# Patient Record
Sex: FEMALE | Race: WHITE
[De-identification: names, ages, dates, MRNs, and addresses within clinical notes are randomized per-mention and may not be internally consistent; named-entity substitution may affect disease eponyms.]

---

## 2017-04-06 ENCOUNTER — HOSPITAL ENCOUNTER (EMERGENCY)
Dept: HOSPITAL 77 - KA.ED | Age: 82
Discharge: NURSING, REHAB OR LONG-TERM CARE | End: 2017-04-06
Payer: MEDICARE

## 2017-04-06 VITALS — SYSTOLIC BLOOD PRESSURE: 145 MMHG | DIASTOLIC BLOOD PRESSURE: 73 MMHG

## 2017-04-06 DIAGNOSIS — Z88.8: ICD-10-CM

## 2017-04-06 DIAGNOSIS — F17.210: ICD-10-CM

## 2017-04-06 DIAGNOSIS — N30.00: Primary | ICD-10-CM

## 2017-04-06 DIAGNOSIS — F41.0: ICD-10-CM

## 2017-04-06 LAB
CHLORIDE SERPL-SCNC: 103 MMOL/L (ref 98–115)
SODIUM SERPL-SCNC: 141 MMOL/L (ref 136–145)

## 2017-04-06 PROCEDURE — 85025 COMPLETE CBC W/AUTO DIFF WBC: CPT

## 2017-04-06 PROCEDURE — 81001 URINALYSIS AUTO W/SCOPE: CPT

## 2017-04-06 PROCEDURE — 71010: CPT

## 2017-04-06 PROCEDURE — 84484 ASSAY OF TROPONIN QUANT: CPT

## 2017-04-06 PROCEDURE — 85610 PROTHROMBIN TIME: CPT

## 2017-04-06 PROCEDURE — 99285 EMERGENCY DEPT VISIT HI MDM: CPT

## 2017-04-06 PROCEDURE — 80053 COMPREHEN METABOLIC PANEL: CPT

## 2017-04-06 PROCEDURE — 96374 THER/PROPH/DIAG INJ IV PUSH: CPT

## 2017-04-06 PROCEDURE — 96375 TX/PRO/DX INJ NEW DRUG ADDON: CPT

## 2017-04-06 PROCEDURE — 85730 THROMBOPLASTIN TIME PARTIAL: CPT

## 2017-04-06 PROCEDURE — 36415 COLL VENOUS BLD VENIPUNCTURE: CPT

## 2017-04-06 PROCEDURE — 70450 CT HEAD/BRAIN W/O DYE: CPT

## 2017-04-06 NOTE — EDM.PDOC
ED HPI NEURO





- General


Chief Complaint: Headache


Stated Complaint: Possible TIA


Time Seen by Provider: 04/06/17 15:21


Source of Information: Reports: Patient, Nursing home records


History Limitations: Reports: No limitations





- History of Present Illness


INITIAL COMMENTS - FREE TEXT/NARRATIVE: 





PT STATES SHE MAY HAVE PASSED OUT THIS AM AT 0800 WHILE SITTING IN CHAIR AT 

[facility]. APPROX DURATION WAS 5 MINUTES. HAD ANOTHER SIMILAR EPISODE AT 1430 

TODAY AND NURSING HOME STAFF BROUGHT PT TO ER. PT STATES SHE THOUGHT IT WAS A 

PANIC ATTACK WHICH ACCORDING TO SON HAS THEM OFTEN. DENIES NEW NUMBNESS, FALL, 

CP, SOB, OR FEVER. 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 

PARALYSIS REMAINS. 


Symptom Onset Date: 04/06/17


Symptom Onset Time: 08:00


Timing/Duration: Reports: Minutes:


Location (Neuro Complaint): Reports: face


Severity: mild


Improves with: Reports: None


Worsens with: Reports: None


Associated Symptoms: Reports: headaches, syncope, nausea/vomiting.  Denies: 

shortness of breath, chest pain, fever/chills





- Related Data


Allergies/ADRs: 


 Allergies











Allergy/AdvReac Type Severity Reaction Status Date / Time


 


hydrochlorathiazide Allergy  Cannot Uncoded 04/06/17 15:47





   Remember  











Home Meds: 


 Home Meds





Cholecalciferol (Vitamin D3) [Vitamin D3] 1,000 units PO DAILY 05/08/15 [History

]


Gluc 2KCl/Chondr/Prerna Hy/Hy Ac [Glucosamine & Chondroitin Cap] 1,000 mg PO 

DAILY 05/08/15 [History]


Latanoprost [Latanoprost] 1 drop EYEBOTH DAILY 05/08/15 [History]


Losartan [Cozaar] 100 mg PO DAILY 05/08/15 [History]


Lovastatin [Lovastatin] 20 mg PO DAILY 05/08/15 [History]


Lutein 20 mg PO DAILY 05/08/15 [History]


Metoprolol Tartrate [Lopressor] 100 mg PO DAILY 05/08/15 [History]


Naproxen Sodium [Aleve] 220 mg PO DAILY 05/08/15 [History]


Niacin 1,500 mg PO DAILY 05/08/15 [History]


Omeprazole [Omeprazole] 20 mg PO DAILY 05/08/15 [History]


Triamcinolone Acetonide [Kenalog 0.1% Crm] 15 gm TOP TID 05/08/15 [History]


amLODIPine Besylate [Amlodipine Besylate] 2.5 mg PO DAILY 05/08/15 [History]











Social & Family History





- Tobacco Use


Smoking Status *Q: Current Every Day Smoker


Years of Tobacco use: 0


Used Tobacco, but Quit: No





- Alcohol Use


Days Per Week of Alcohol Use: 0





- Recreational Drug Use


Recreational Drug Use: No





- Living Situation & Occupation


Living situation: Reports: alone


Occupation: retired





ED ROS GENERAL





- Review of Systems


Review Of Systems: ROS reveals no pertinent complaints other than HPI.


Constitutional: Reports: no symptoms


HEENT: Reports: No symptoms


Respiratory: Reports: No Symptoms


Cardiovascular: Reports: No symptoms


Endocrine: Reports: no symptoms


GI/Abdominal: Reports: Nausea


: Reports: no symptoms


Musculoskeletal: Reports: no symptoms


Skin: Reports: no symptoms


Neurological: Reports: Headache, Numbness, Pre-Existing Deficit


Psychiatric: Reports: No symptoms


Hematologic/Lymphatic: Reports: no symptoms


Immunologic: Reports: no symptoms





ED EXAM, NEURO





- Physical Exam


Exam: See Below


Exam Limited By: No limitations


General Appearance: alert, WD/WN, no apparent distress


Eye Exam: bilateral eye: normal inspection


Ears: normal external exam, normal canal, normal TMs


Nose: normal inspection, normal mucosa, no blood


Throat/Mouth: Normal inspection, Normal oropharynx, No airway compromise


Head Exam: atraumatic, normocephalic


Neck: normal inspection, supple


Respiratory/Chest: no respiratory distress, lungs clear, normal breath sounds, 

no accessory muscle use, chest non-tender


Cardiovascular: regular rate, rhythm, systolic murmur


GI/Abdominal: normal bowel sounds, soft, non tender, no organomegaly, no 

distention, no abnormal bruit, no mass


Neurological: alert, oriented x 3, abnormal light touch


Back Exam: normal inspection.  No: CVA tenderness (L), CVA tenderness (R)


Extremities: normal inspection, non-tender


Psychiatric: normal affect, normal mood


Skin Exam: Warm, Dry, Intact, Normal color, No rash





Course





- Vital Signs


Last Recorded V/S: 


 Last Vital Signs











Temp  96.5 F   04/06/17 15:32


 


Pulse  52 L  04/06/17 15:32


 


Resp  16   04/06/17 15:32


 


BP  169/50 H  04/06/17 15:32


 


Pulse Ox  96   04/06/17 15:32














- Orders/Labs/Meds


Orders: 


 Active Orders 24 hr











 Category Date Time Status


 


 EKG Documentation Completion [RC] ASDIRECTED Care  04/06/17 15:22 Active


 


 Peripheral IV Care [RC] .AS DIRECTED Care  04/06/17 15:22 Active


 


 Chest 1V Frontal [CR] Stat Exams  04/06/17 15:21 Taken


 


 Head wo Cont [CT] Stat Exams  04/06/17 15:21 Taken


 


 Sodium Chloride 0.9% [Syrex Flush] Med  04/06/17 15:21 Active





 5 ml FLUSH Q8HR PRN   


 


 cefTRIAXone [Rocephin] Med  04/06/17 17:27 Once





 1 gm IVPUSH ONETIME ONE   


 


 Peripheral IV Insertion Adult [OM.PC] Urgent Oth  04/06/17 15:21 Ordered


 


 Saline Lock Insert [OM.PC] Stat Oth  04/06/17 15:21 Ordered


 


 EKG 12 Lead [EK] Stat Ther  04/06/17 15:21 Ordered








 Medication Orders





Sodium Chloride (Syrex Flush)  5 ml FLUSH Q8HR PRN


   PRN Reason: Keep Vein Open








Labs: 


 Laboratory Tests











  04/06/17 04/06/17 04/06/17 Range/Units





  15:35 15:35 15:35 


 


WBC  7.8    (5.0-10.0)  10^3/uL


 


RBC  4.79    (3.80-5.50)  10^6/uL


 


Hgb  12.8    (12.0-16.0)  g/dL


 


Hct  39.3    (37.0-47.0)  %


 


MCV  82.1    (82.0-92.0)  fL


 


MCH  26.8 L    (27.0-31.0)  pg


 


MCHC  32.7    (32.0-36.0)  g/dL


 


RDW  14.6 H    (11.5-14.5)  %


 


Plt Count  241    (150-300)  10^3/uL


 


MPV  8.1    (7.4-10.4)  fL


 


Add Manual Diff  Yes    


 


Neutrophils % (Manual)  59    (50-70)  %


 


Band Neutrophils %  1 L    (4-12)  %


 


Lymphocytes % (Manual)  29    (20-40)  %


 


Monocytes % (Manual)  5    (2-8)  %


 


Eosinophils % (Manual)  5 H    (1-3)  %


 


Basophils % (Manual)  1    (0-1)  %


 


PT   9.9   (8.9-11.4)  SEC


 


INR   1.0   (0.9-1.1)  


 


APTT   27.7   (20.8-31.2)  SEC


 


Sodium    141  (136-145)  mmol/L


 


Potassium    4.6  (3.3-5.3)  mmol/L


 


Chloride    103  ()  mmol/L


 


Carbon Dioxide    31.3  (21.0-32.0)  mmol/L


 


BUN    17  (6-25)  mg/dL


 


Creatinine    0.85  (0.51-1.17)  mg/dL


 


Est Cr Clr Drug Dosing    41.48  mL/min


 


Estimated GFR (MDRD)    > 60  mL/min


 


Glucose    98  ()  mg/dL


 


Calcium    9.0  (8.7-10.3)  mg/dL


 


Total Bilirubin    0.3  (0.2-1.0)  mg/dL


 


AST    33  (15-37)  U/L


 


ALT    28  (12-78)  U/L


 


Alkaline Phosphatase    88  ()  IU/L


 


Troponin I    0.05  (0.00-0.070)  ng/mL


 


Total Protein    8.3 H  (6.4-8.2)  g/dL


 


Albumin    3.69  (3.00-4.80)  g/dL


 


Specimen Type     


 


Urine Color     (YELLOW)  


 


Urine Appearance     (CLEAR)  


 


Urine pH     (5.0-9.0)  


 


Ur Specific Gravity     (1.005-1.030)  


 


Urine Protein     (NEGATIVE)  mg/dL


 


Urine Glucose (UA)     (NEGATIVE)  mg/dL


 


Urine Ketones     (NEGATIVE)  mg/dL


 


Urine Occult Blood     (NEGATIVE)  


 


Urine Nitrite     (NEGATIVE)  


 


Urine Bilirubin     (NEGATIVE)  


 


Urine Urobilinogen     (0.2-1.0)  E.U./dL


 


Ur Leukocyte Esterase     (NEGATIVE)  


 


Urine RBC     /HPF


 


Urine WBC     /HPF


 


Urine Bacteria     (NONE TO FEW)  /HPF














  04/06/17 Range/Units





  16:30 


 


WBC   (5.0-10.0)  10^3/uL


 


RBC   (3.80-5.50)  10^6/uL


 


Hgb   (12.0-16.0)  g/dL


 


Hct   (37.0-47.0)  %


 


MCV   (82.0-92.0)  fL


 


MCH   (27.0-31.0)  pg


 


MCHC   (32.0-36.0)  g/dL


 


RDW   (11.5-14.5)  %


 


Plt Count   (150-300)  10^3/uL


 


MPV   (7.4-10.4)  fL


 


Add Manual Diff   


 


Neutrophils % (Manual)   (50-70)  %


 


Band Neutrophils %   (4-12)  %


 


Lymphocytes % (Manual)   (20-40)  %


 


Monocytes % (Manual)   (2-8)  %


 


Eosinophils % (Manual)   (1-3)  %


 


Basophils % (Manual)   (0-1)  %


 


PT   (8.9-11.4)  SEC


 


INR   (0.9-1.1)  


 


APTT   (20.8-31.2)  SEC


 


Sodium   (136-145)  mmol/L


 


Potassium   (3.3-5.3)  mmol/L


 


Chloride   ()  mmol/L


 


Carbon Dioxide   (21.0-32.0)  mmol/L


 


BUN   (6-25)  mg/dL


 


Creatinine   (0.51-1.17)  mg/dL


 


Est Cr Clr Drug Dosing   mL/min


 


Estimated GFR (MDRD)   mL/min


 


Glucose   ()  mg/dL


 


Calcium   (8.7-10.3)  mg/dL


 


Total Bilirubin   (0.2-1.0)  mg/dL


 


AST   (15-37)  U/L


 


ALT   (12-78)  U/L


 


Alkaline Phosphatase   ()  IU/L


 


Troponin I   (0.00-0.070)  ng/mL


 


Total Protein   (6.4-8.2)  g/dL


 


Albumin   (3.00-4.80)  g/dL


 


Specimen Type  Urincath  


 


Urine Color  Yellow  (YELLOW)  


 


Urine Appearance  Slightly cloudy H  (CLEAR)  


 


Urine pH  5.5  (5.0-9.0)  


 


Ur Specific Gravity  1.015  (1.005-1.030)  


 


Urine Protein  Negative  (NEGATIVE)  mg/dL


 


Urine Glucose (UA)  Negative  (NEGATIVE)  mg/dL


 


Urine Ketones  Negative  (NEGATIVE)  mg/dL


 


Urine Occult Blood  Trace-lysed H  (NEGATIVE)  


 


Urine Nitrite  Negative  (NEGATIVE)  


 


Urine Bilirubin  Negative  (NEGATIVE)  


 


Urine Urobilinogen  0.2  (0.2-1.0)  E.U./dL


 


Ur Leukocyte Esterase  Small H  (NEGATIVE)  


 


Urine RBC  0-5  /HPF


 


Urine WBC  20-30 H  /HPF


 


Urine Bacteria  Moderate H  (NONE TO FEW)  /HPF











Meds: 


Medications











Generic Name Dose Route Start Last Admin





  Trade Name Freq  PRN Reason Stop Dose Admin


 


Sodium Chloride  5 ml  04/06/17 15:21  





  Syrex Flush  FLUSH   





  Q8HR PRN   





  Keep Vein Open   














Discontinued Medications














Generic Name Dose Route Start Last Admin





  Trade Name Freq  PRN Reason Stop Dose Admin


 


Ondansetron HCl  4 mg  04/06/17 15:36  04/06/17 17:00





  Zofran  IVPUSH  04/06/17 15:37  4 mg





  ONETIME ONE   Administration














Departure





- Departure


Time of Disposition: 18:03


Disposition: DC/Tfer to [facility]


Condition: good


Clinical Impression: 


 Panic attacks





Urinary tract infection


Qualifiers:


 Urinary tract infection type: acute cystitis Hematuria presence: without 

hematuria Qualified Code(s): N30.00 - Acute cystitis without hematuria





Instructions:  Urinary Tract Infection, Adult, Panic Attacks, Easy-to-Read





- My Orders


Last 24 Hours: 


My Active Orders





04/06/17 15:21


Chest 1V Frontal [CR] Stat 


Head wo Cont [CT] Stat 


Sodium Chloride 0.9% [Syrex Flush]   5 ml FLUSH Q8HR PRN 


Peripheral IV Insertion Adult [OM.PC] Urgent 


Saline Lock Insert [OM.PC] Stat 


EKG 12 Lead [EK] Stat 





04/06/17 15:22


EKG Documentation Completion [RC] ASDIRECTED 


Peripheral IV Care [RC] .AS DIRECTED 





04/06/17 17:27


cefTRIAXone [Rocephin]   1 gm IVPUSH ONETIME ONE 














- Assessment/Plan


Last 24 Hours: 


My Active Orders





04/06/17 15:21


Chest 1V Frontal [CR] Stat 


Head wo Cont [CT] Stat 


Sodium Chloride 0.9% [Syrex Flush]   5 ml FLUSH Q8HR PRN 


Peripheral IV Insertion Adult [OM.PC] Urgent 


Saline Lock Insert [OM.PC] Stat 


EKG 12 Lead [EK] Stat 





04/06/17 15:22


EKG Documentation Completion [RC] ASDIRECTED 


Peripheral IV Care [RC] .AS DIRECTED 





04/06/17 17:27


cefTRIAXone [Rocephin]   1 gm IVPUSH ONETIME ONE 











Assessment:: 





UTI


Plan: 





BACTRIM / F-U WITH PCP IN 2 DAYS

## 2018-01-26 ENCOUNTER — HOSPITAL ENCOUNTER (EMERGENCY)
Dept: HOSPITAL 77 - KA.ED | Age: 83
Discharge: NURSING, REHAB OR LONG-TERM CARE | End: 2018-01-26
Payer: MEDICARE

## 2018-01-26 VITALS — DIASTOLIC BLOOD PRESSURE: 50 MMHG | SYSTOLIC BLOOD PRESSURE: 131 MMHG

## 2018-01-26 DIAGNOSIS — Z88.8: ICD-10-CM

## 2018-01-26 DIAGNOSIS — Z79.899: ICD-10-CM

## 2018-01-26 DIAGNOSIS — Z87.891: ICD-10-CM

## 2018-01-26 DIAGNOSIS — M25.561: Primary | ICD-10-CM

## 2018-01-26 NOTE — EDM.PDOC
ED HPI GENERAL MEDICAL PROBLEM





- General


Chief Complaint: Lower Extremity Injury/Pain


Stated Complaint: R hip-leg pain


Time Seen by Provider: 01/26/18 04:02


Source of Information: Reports: Patient


History Limitations: Reports: No Limitations





- History of Present Illness


INITIAL COMMENTS - FREE TEXT/NARRATIVE: 





Patient presents via ambulance from [facility] with complaint of right leg pain.  

Patient tells me she went to bed around 9pm and while getting in bed with the 

lift, she bumped her right lateral knee against the wall.  A few hours later 

she awoke with pain at the knee.  She took Tylenol but it didn't help that 

much.  Pain is 5/5.  She hasn't put weight on her leg for 3 years and denies 

any falls.  No history of heart disease or clots.  She had a stroke about 8 

months ago that left her paralyzed on the left side.  She has told me a couple 

of times that she hasn't been able to walk on her right leg for three years but 

also tell me that she walked fine until her stroke last May.  She is getting 

therapy for her legs.  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 

can move her left foot okay.


Treatments PTA: Reports: Acetaminophen





- Related Data


 Allergies











Allergy/AdvReac Type Severity Reaction Status Date / Time


 


dimethyl sulfoxide Allergy  Cannot Uncoded 01/26/18 03:41





   Remember  


 


hydrochlorathiazide Allergy  Cannot Uncoded 04/06/17 15:47





   Remember  











Home Meds: 


 Home Meds





Cholecalciferol (Vitamin D3) [Vitamin D3] 1,000 units PO DAILY 05/08/15 [History

]


Gluc 2KCl/Chondr/Prerna Hy/Hy Ac [Glucosamine & Chondroitin Cap] 1,000 mg PO 

DAILY 05/08/15 [History]


Latanoprost [Latanoprost] 1 drop EYEBOTH DAILY 05/08/15 [History]


Losartan [Cozaar] 100 mg PO DAILY 05/08/15 [History]


Lovastatin [Lovastatin] 20 mg PO DAILY 05/08/15 [History]


Lutein 20 mg PO DAILY 05/08/15 [History]


Metoprolol Tartrate [Lopressor] 100 mg PO DAILY 05/08/15 [History]


Naproxen Sodium [Aleve] 220 mg PO DAILY 05/08/15 [History]


Niacin 1,500 mg PO DAILY 05/08/15 [History]


Omeprazole [Omeprazole] 20 mg PO DAILY 05/08/15 [History]


Triamcinolone Acetonide [Kenalog 0.1% Crm] 15 gm TOP TID 05/08/15 [History]


amLODIPine Besylate [Amlodipine Besylate] 2.5 mg PO DAILY 05/08/15 [History]


Cephalexin [Keflex] 500 mg PO TID #15 cap 04/06/17 [Rx]











Social & Family History





- Tobacco Use


Smoking Status *Q: Former Smoker


Years of Tobacco use: 0


Used Tobacco, but Quit: Yes


Month Tobacco Last Used: may 2016





- Caffeine Use


Caffeine Use: Reports: Coffee





- Alcohol Use


Days Per Week of Alcohol Use: 0





- Recreational Drug Use


Recreational Drug Use: No





- Living Situation & Occupation


Living situation: Reports: Alone


Occupation: Retired





Review of Systems





- Review of Systems


Review Of Systems: See Below


Constitutional: Denies: Chills, Fever


Eyes: Denies: Vision Change


Mouth/Throat: Reports: No Symptoms


Respiratory: Denies: Shortness of Breath, Cough


Cardiovascular: Denies: Chest Pain, Irregular Heart Rate, Lightheadedness


GI/Abdominal: Denies: Abdominal Pain, Diarrhea, Nausea, Vomiting


Genitourinary: Reports: Incontinence.  Denies: Dysuria


Musculoskeletal: Reports: Back Pain (arthritis).  Denies: Neck Pain, Shoulder 

Pain, Joint Swelling


Skin: Denies: Cyanosis, Jaundice, Mottled, Pallor, Diaphoresis


Neurological: Denies: Numbness, Tingling, Trouble Speaking





ED EXAM, GENERAL





- Physical Exam


Exam: See Below


Exam Limited By: No Limitations


General Appearance: Alert, No Apparent Distress, Obese


Eye Exam: Bilateral Eye: EOMI, Normal Inspection, PERRL


Ears: Normal External Exam, Hearing Grossly Normal


Nose: Normal Inspection, No Blood


Throat/Mouth: Normal Inspection, Normal Lips, Normal Voice, No Airway Compromise


Head: Atraumatic, Normocephalic


Neck: Normal Inspection, Supple, Non-Tender.  No: Carotid Bruit


Respiratory/Chest: No Respiratory Distress, Lungs Clear, Normal Breath Sounds.  

No: Stridor


Cardiovascular: Regular Rate, Rhythm (with mild bradycardia), No JVD, Systolic 

Murmur


Extremities: No Pedal Edema, Normal Capillary Refill, Other (Moderate ROM of 

right knee and hip is well tolerated without pain.  Palpation of the leg/knee 

reveals pain primarily at the right patella but no ecchymosis or bruising.  

Calf is supple and non-tender to squeeze without swelling.  No hip pain on ROM 

or palpation.  She can dorsiflex and plantar flex both feet okay and sensation 

is intact bilat.  No assymetric swelling.  She has chronic weakness of both 

legs and is unable to raise them from the table.).  No: Venkatesh's Sign, Mottled, 

Pallor, Redness


Neurological: Alert, Oriented, No Motor/Sensory Deficits


Psychiatric: Normal Affect, Normal Mood


Skin Exam: Warm, Dry, Intact, Normal Color, No Rash





Course





- Vital Signs


Last Recorded V/S: 





 Last Vital Signs











Temp  98.8 F   01/26/18 03:41


 


Pulse  52 L  01/26/18 03:41


 


Resp  20   01/26/18 03:41


 


BP  144/52 H  01/26/18 03:41


 


Pulse Ox  94 L  01/26/18 03:41














- Re-Assessments/Exams


Free Text/Narrative Re-Assessment/Exam: 





01/26/18 04:50


Pain is decreasing with application of ice to the anterior knee.  I see no 

evidence of fracture or DVT.  Discussed findings with patient.  She has been 

stable throughout ER course.





Departure





- Departure


Time of Disposition: 04:54


Disposition: DC/Tfer to SNF [facility]


Condition: Fair


Clinical Impression: 


 Right anterior knee pain








- Discharge Information


Referrals: 


Harper Ko PA-C [Primary Care Provider] - 


Additional Instructions: 


1. Continue to use Tylenol and ice as needed to control knee pain. 


2. May try NSAIDS if okay with PCP.


3. Recheck with PCP if persisting or worsening.

## 2019-12-28 NOTE — PCM.PN
- General Info


Date of Service: 12/28/19


Functional Status: Reports: Pain Controlled, Tolerating Diet, Urinating.  Denies

: New Symptoms





- Review of Systems


General: Denies: Fever


HEENT: Reports: Other (drainage of left eye).  Denies: Eye Pain, Headaches


Pulmonary: Denies: Shortness of Breath, Cough


Cardiovascular: Denies: Chest Pain


Gastrointestinal: Denies: Abdominal Pain


Musculoskeletal: Reports: Leg Pain (left), Foot Pain (left)


Neurological: Denies: Headache





- Patient Data


Vitals - Most Recent: 


 Last Vital Signs











Temp  96.8 F   12/28/19 06:18


 


Pulse  98   12/28/19 09:10


 


Resp  24 H  12/28/19 06:18


 


BP  110/71   12/28/19 09:10


 


Pulse Ox  94 L  12/28/19 06:18











Weight - Most Recent: 196 lb


I&O - Last 24 Hours: 


 Intake & Output











 12/27/19 12/28/19 12/28/19





 22:59 06:59 14:59


 


Intake Total 520 300 


 


Balance 520 300 











Lab Results Last 24 Hours: 


 Laboratory Results - last 24 hr











  12/27/19 12/27/19 12/27/19 Range/Units





  13:00 13:00 15:00 


 


WBC     (5.00-10.00)  10^3/uL


 


RBC     (3.80-5.50)  10^6/uL


 


Hgb     (12.0-16.0)  g/dL


 


Hct     (37.0-47.0)  %


 


MCV     (82.0-92.0)  fL


 


MCH     (27.0-31.0)  pg


 


MCHC     (32.0-36.0)  g/dL


 


RDW     (11.5-14.5)  %


 


Plt Count     (150-400)  10^3/uL


 


MPV     (7.4-10.4)  fL


 


Immature Gran % (Auto)     (0.0-5.0)  %


 


Neut % (Auto)     (50.0-70.0)  %


 


Lymph % (Auto)     (20.0-40.0)  %


 


Mono % (Auto)     (2.0-8.0)  %


 


Eos % (Auto)     (1.0-3.0)  %


 


Baso % (Auto)     (0.0-1.0)  %


 


Immature Gran # (Auto)     (0.00-0.50)  10^3/uL


 


Neut # (Auto)     (2.50-7.00)  10^3/uL


 


Lymph # (Auto)     (1.00-4.00)  10^3/uL


 


Mono # (Auto)     (0.10-0.80)  10^3/uL


 


Eos # (Auto)     (0.10-0.30)  10^3/uL


 


Baso # (Auto)     (0.00-0.10)  10^3/uL


 


Sodium     (136-145)  mmol/L


 


Potassium     (3.3-5.3)  mmol/L


 


Chloride     ()  mmol/L


 


Carbon Dioxide     (21.0-32.0)  mmol/L


 


Anion Gap     (5-15)  mmol/L


 


BUN     (6-25)  mg/dL


 


Creatinine     (0.51-1.17)  mg/dL


 


Est Cr Clr Drug Dosing     mL/min


 


Estimated GFR (MDRD)     mL/min


 


Glucose    (75 - 99) mg/dL


 


Calcium     (8.7-10.3)  mg/dL


 


Magnesium  1.8    (1.8-2.4)  mg/dL


 


Troponin I  0.04    (0.00-0.070)  ng/mL


 


B-Natriuretic Peptide   143 H   (0-100)  pg/mL


 


Free T4    < 0.20 L  (0.59-1.17)  ng/dL


 


TSH, Ultra Sensitive   78.100 H   (0.340-4.820)  uIU/mL














  12/28/19 12/28/19 Range/Units





  08:05 08:05 


 


WBC  6.68   (5.00-10.00)  10^3/uL


 


RBC  4.57   (3.80-5.50)  10^6/uL


 


Hgb  12.4   (12.0-16.0)  g/dL


 


Hct  40.0   (37.0-47.0)  %


 


MCV  87.5  D   (82.0-92.0)  fL


 


MCH  27.1   (27.0-31.0)  pg


 


MCHC  31.0 L   (32.0-36.0)  g/dL


 


RDW  15.0 H   (11.5-14.5)  %


 


Plt Count  191   (150-400)  10^3/uL


 


MPV  10.0   (7.4-10.4)  fL


 


Immature Gran % (Auto)  0.1   (0.0-5.0)  %


 


Neut % (Auto)  73.2 H   (50.0-70.0)  %


 


Lymph % (Auto)  12.6 L   (20.0-40.0)  %


 


Mono % (Auto)  9.0 H   (2.0-8.0)  %


 


Eos % (Auto)  4.5 H   (1.0-3.0)  %


 


Baso % (Auto)  0.6   (0.0-1.0)  %


 


Immature Gran # (Auto)  0.01   (0.00-0.50)  10^3/uL


 


Neut # (Auto)  4.89   (2.50-7.00)  10^3/uL


 


Lymph # (Auto)  0.84 L   (1.00-4.00)  10^3/uL


 


Mono # (Auto)  0.60   (0.10-0.80)  10^3/uL


 


Eos # (Auto)  0.30   (0.10-0.30)  10^3/uL


 


Baso # (Auto)  0.04   (0.00-0.10)  10^3/uL


 


Sodium   145  (136-145)  mmol/L


 


Potassium   4.1  (3.3-5.3)  mmol/L


 


Chloride   102  ()  mmol/L


 


Carbon Dioxide   28.7  (21.0-32.0)  mmol/L


 


Anion Gap   18.4 H  (5-15)  mmol/L


 


BUN   13  (6-25)  mg/dL


 


Creatinine   0.76  (0.51-1.17)  mg/dL


 


Est Cr Clr Drug Dosing   43.95  mL/min


 


Estimated GFR (MDRD)   > 60  mL/min


 


Glucose   93 (75 - 99) mg/dL


 


Calcium   8.8  (8.7-10.3)  mg/dL


 


Magnesium    (1.8-2.4)  mg/dL


 


Troponin I    (0.00-0.070)  ng/mL


 


B-Natriuretic Peptide    (0-100)  pg/mL


 


Free T4    (0.59-1.17)  ng/dL


 


TSH, Ultra Sensitive    (0.340-4.820)  uIU/mL











Med Orders - Current: 


 Current Medications





Acetaminophen (Tylenol)  650 mg PO BID NATY


   Last Admin: 12/28/19 09:10 Dose:  650 mg


Albuterol (Proventil Neb Soln)  2.5 mg INH BID PRN


   PRN Reason: Shortness of Breath


Apixaban (Eliquis)  5 mg PO BID [facility]


   Last Admin: 12/28/19 09:10 Dose:  5 mg


Artificial Tears (Refresh Tears 0.5%)  0 ml EYEBOTH TID@0500,1100,2000 [facility]


Aspirin (Aspirin)  81 mg PO WITHBREAKFAST [facility]


   Last Admin: 12/28/19 07:40 Dose:  81 mg


Atorvastatin Calcium (Lipitor)  20 mg PO BEDTIME [facility]


   Last Admin: 12/27/19 20:58 Dose:  20 mg


Atropine Sulfate (Atropine 0.1 Mg/Ml)  0 mg IVPUSH ASDIRECTED PRN


   PRN Reason: Heart.


Brimonidine Tartrate (Brimonidine Tartrate 0.2% Ophth Soln)  0 ml EYEBOTH BID 

[facility]


   Last Admin: 12/28/19 09:22 Dose:  1 drop


Buspirone HCl (Buspar)  5 mg PO 0800,1600 [facility]


   Last Admin: 12/28/19 07:40 Dose:  5 mg


Cholecalciferol (Vitamin D3)  50 mcg PO DAILY [facility]


   Last Admin: 12/28/19 09:10 Dose:  50 mcg


Epinephrine HCl (Epinephrine 1:10,000)  1 mg IVPUSH ASDIRECTED PRN


   PRN Reason: Heart.


Gabapentin (Neurontin)  100 mg PO DAILY [facility]


   Last Admin: 12/28/19 09:10 Dose:  100 mg


Gabapentin (Neurontin)  200 mg PO BEDTIME [facility]


   Last Admin: 12/27/19 20:56 Dose:  200 mg


Glycopyrrolate/Indacaterol (Utibron Neohaler 27.5-15.6 Mcg)  0 each IH BID [facility]


   Last Admin: 12/28/19 09:36 Dose:  1 inhalation


Lactobacillus Acidophilus/Rhamnosus (Multi-Dyana Plus)  1 cap PO DAILY [facility]


   Last Admin: 12/28/19 09:10 Dose:  1 cap


Latanoprost (Xalatan 0.005% Ophth Soln)  0 ml EYEBOTH BEDTIME [facility]


   Last Admin: 12/27/19 22:06 Dose:  1 drop


Levothyroxine Sodium (Synthroid)  50 mcg PO ACBREAKFAST [facility]


   Last Admin: 12/28/19 07:39 Dose:  50 mcg


Lidocaine HCl (Xylocaine 2%)  0 mg IVPUSH ASDIRECTED PRN


   PRN Reason: Heart.


Magnesium Hydroxide (Milk Of Magnesia)  30 ml PO DAILY PRN


   PRN Reason: Constipation


Metoprolol Tartrate (Lopressor)  25 mg PO BID [facility]


   Last Admin: 12/28/19 09:10 Dose:  25 mg


Multivitamins/Minerals (Centrum)  1 tab PO DAILY [facility]


   Last Admin: 12/28/19 09:10 Dose:  1 tab


Nitroglycerin (Nitrostat)  0.4 mg SL ASDIRECTED PRN


   PRN Reason: Heart.


Omeprazole (Omeprazole)  20 mg PO ACBREAKFAST [facility]


   Last Admin: 12/28/19 07:40 Dose:  20 mg


Sertraline HCl (Zoloft)  50 mg PO DAILY [facility]


   Last Admin: 12/28/19 09:10 Dose:  50 mg


Sodium Chloride (Saline Flush)  10 ml FLUSH Q8HR PRN


   PRN Reason: keep vein open


   Last Admin: 12/27/19 20:14 Dose:  10 ml


Trazodone HCl (Trazodone)  50 mg PO BEDTIME [facility]


   Last Admin: 12/27/19 21:00 Dose:  50 mg





Discontinued Medications





Artificial Tears (Liquitears 1.4% Ophth Soln)  0 ml EYEBOTH TID@05,11,20 [facility]


   Last Admin: 12/28/19 05:26 Dose:  Not Given


Buspirone HCl (Buspar)  5 mg PO BID [facility]


   Last Admin: 12/27/19 20:59 Dose:  5 mg


Losartan Potassium (Cozaar)  75 mg PO DAILY [facility]


   Last Admin: 12/28/19 09:32 Dose:  Not Given


Metoprolol Tartrate (Lopressor)  5 mg IVPUSH ONETIME ONE


   Stop: 12/27/19 14:53


   Last Admin: 12/27/19 16:38 Dose:  5 mg


Metoprolol Tartrate (Lopressor)  5 mg IVPUSH ONETIME ONE


   Stop: 12/27/19 18:39


   Last Admin: 12/27/19 18:54 Dose:  5 mg


Metoprolol Tartrate (Lopressor)  5 mg IVPUSH Q1H PRN


   PRN Reason: Tachycardia


Metoprolol Tartrate (Lopressor)  5 mg IVPUSH ONETIME ONE


   Stop: 12/27/19 19:30


   Last Admin: 12/27/19 20:12 Dose:  5 mg


Non-Formulary Medication (Benzocaine [Orajel])  1 applic BUCCAL TID PRN


   PRN Reason: sore gums


Non-Formulary Medication (Guaifenesin [Liquituss Gg])  10 ml PO BID PRN


   PRN Reason: Cough


Non-Formulary Medication (Pantoprazole Sodium [Protonix])  20 mg PO DAILY@0500 

NATY











- Exam


Quality Assessment: DVT Prophylaxis (on Eliquis).  No: Supplemental Oxygen (94% 

on room air)


General: Alert, Oriented (to self), Cooperative, No Acute Distress


HEENT: Other (conjunctiva clear bilaterally, with yellowish discharge to left 

eyelashes)


Lungs: Clear to Auscultation (upper lobes), Normal Respiratory Effort, Crackles 

(to bilateral bases).  No: Wheezing


Cardiovascular: Irregular Rhythm, Tachycardia


Extremities: Pedal Edema (trace pitting edema to BLE)


Skin: Warm, Dry, Intact


Neurological: Normal Speech, Other (hemiparesis of left arm and leg)


Psy/Mental Status: Alert, Normal Affect, Normal Mood.  No: Anxious





Sepsis Event Note





- Evaluation


Sepsis Screening Result: Sepsis Risk





- Focused Exam


Vital Signs: 


 Vital Signs











  Temp Pulse Pulse Resp BP BP Pulse Ox


 


 12/28/19 09:10   98    110/71  


 


 12/28/19 06:18  96.8 F   105 H  24 H   131/88  94 L


 


 12/28/19 03:00  96.7 F   110 H  28 H   110/76  93 L


 


 12/27/19 23:00  96.6 F    24 H   107/75  91 L











Date Exam was Performed: 12/28/19


Time Exam was Performed: 10:56





- Problem List Review


Problem List Initiated/Reviewed/Updated: Yes





- My Orders


Last 24 Hours: 


My Active Orders





12/27/19 12:23


Up With Assistance [RC] ASDIRECTED 


CULTURE SPUTUM + SMEAR [RM] Stat 


Sodium Chloride 0.9% [Saline Flush]   10 ml FLUSH Q8HR PRN 


Saline Lock Insert [OM.PC] Routine 


Resuscitation Status Routine 





12/27/19 12:24


Admission Status [Patient Status] [ADT] Routine 


Oxygen Therapy [RC] PRN 


VTE/DVT Education [RC] PER UNIT ROUTINE 


Vital Signs [RC] 0300,0700,1100,1500,1900,2300 





12/27/19 12:33


Telemetry Monitoring [Cardiac Monitoring] [RC] 0300,0700,1100,1500,1900,2300 


Atropine [Atropine 0.1 MG/ML]   See Dose Instructions  IVPUSH ASDIRECTED PRN 


EPINEPHrine [EPINEPHrine 1:10,000]   1 mg IVPUSH ASDIRECTED PRN 


Lidocaine 2% [Xylocaine 2%]   See Dose Instructions  IVPUSH ASDIRECTED PRN 


Nitroglycerin [Nitrostat]   0.4 mg SL ASDIRECTED PRN 





12/27/19 15:00


FREE T3 [REF] Routine 


Albuterol [Proventil Neb Soln]   2.5 mg INH BID PRN 





12/27/19 15:02


Magnesium Hydroxide [Milk of Magnesia]   30 ml PO DAILY PRN 





12/27/19 21:00


Acetaminophen [Tylenol]   650 mg PO BID 


Apixaban [Eliquis]   5 mg PO BID 


Brimonidine Tartrate [Brimonidine Tartrate 0.2% Ophth Soln]   0 ml EYEBOTH BID 


Gabapentin [Neurontin]   200 mg PO BEDTIME 


Latanoprost [Xalatan 0.005% Ophth Soln]   0 ml EYEBOTH BEDTIME 


Metoprolol Tartrate [Lopressor]   25 mg PO BID 


atorvaSTATin [Lipitor]   20 mg PO BEDTIME 


traZODone   50 mg PO BEDTIME 





12/27/19 Dinner


Pureed Diet [DIET] 





12/28/19 07:30


Levothyroxine [Synthroid]   50 mcg PO ACBREAKFAST 


Omeprazole   20 mg PO ACBREAKFAST 





12/28/19 08:00


Aspirin   81 mg PO WITHBREAKFAST 


busPIRone [Buspar]   5 mg PO 0800,1600 





12/28/19 09:00


B.Bif/B.Long/L.Acidoph/L.Rhamn [Multi-Dyana Plus]   1 cap PO DAILY 


Cholecalciferol (Vitamin D3) [Vitamin D3]   50 mcg PO DAILY 


FA/Lycopene/Lut/MV,Ca,Iron,Min [Centrum]   1 tab PO DAILY 


Gabapentin [Neurontin]   100 mg PO DAILY 


Indacaterol/Glycopyrrolate [Utibron Neohaler 27.5-15.6 MCG]   0 each IH BID 


Sertraline [Zoloft]   50 mg PO DAILY 





12/28/19 11:00


Carboxymethylcellulose Sodium [Refresh Tears 0.5%]   0 ml EYEBOTH TID@0500,1100,

2000 





12/29/19 05:11


BMP [BASIC METABOLIC PANEL,BMP] [CHEM] AM 


CBC WITH AUTO DIFF [HEME] AM 














- Plan


Plan:: 





HPI: This is an 87 yo female who was evaluated in the clinic yesterday due to 

shortness of breath, weakness, and tachycardia. NH reported that patient had 

two emesis on 12/24 and 12/25/19 and the following day she required oxygen for 

O2 sat of 88-89%. Reported to have a congested cough, diminished lung sounds, 

and tachycardia. Weight down 5 lbs in approximately 2 weeks. Patient was found 

to be in new onset atrial flutter/fib with concern for aspiration pneumonia. 

She was subsequently admitted for further work-up and treatment. She is a 

resident of the [facility]. 





Pertinent Clinic Work-up: 


WBC 5.7 w/o neutrophilia


CXR noted radiographically mild fluid overload or failure


ESR 31


EKG atrial flutter-fibrillation with RVR (113 bpm), LVH, inferior and 

anteroseptal infarcts-age undetermined





________________________________________________________________________________

_____________________________________________________





Overnight update: Received 15 mg of lopressor IV and started on lopressor 25 mg 

at bedtime with notable improvement in heart rate. Has remained afebrile and no 

oxygen requirement. 





Primary Impression/Plan: 





Atrial flutter-fibrillation, new onset. Continue with lopressor 25 mg BID, will 

titrate up as needed. Continue with telemetry. [id number] score 7. Continue 

eliquis 5 mg BID. Mg 1.8. TSH 78, Free T4 <0.02. K 4.1. Creatinine 0.76. 





Tachypnea, likely related to tachycardia. WBC 6.68 with 73% neutrophils. 

Procalcitonin 0.04. CXR not showing infiltrate. . Does not appear to be 

clinically fluid overloaded. Troponin 0.04. Will continue to monitor. Does have 

underlying COPD. Will add incentive spirometry. 





Primary hypothyroidism, new onset. TSH 78, Free T4 <0.02, T3 pending. Initiated 

levothyroxine 50 mcg daily this morning. Repeat level in 8 weeks. 





Secondary Impression/Plan: 





COPD. Continue albuterol neb PRN, Utibron Neohaler (therapeutic substitution). 





HTN. Holding losartan until lopressor dose is sufficient. Likely will restart 

losartan at 25-50 mg daily instead of previous 75 mg dose. 





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 Decreased 

home aspirin to 81 mg daily given addition of eliquis. Will require referral to 

anti-coag clinic on discharge. 





Chronic diastolic CHF. ECHO (2015) EF 60%, normal systolic function, mild 

diastolic dysfunction. Not on any diuretics. 





HLD. Continue lipitor 40 mg daily. LDL 93 (12/2019). 





LYNN. Continue buspar 5 mg BID, zoloft 50 mg daily. 





Panic disorder. 





Psychophysiological insomnia. Continue trazodone 50 mg at HS. 





Normocytic anemia, history. 





Postherpetic neuralgia. Continue gabapentin 100 mg AM and 200 mg PM. 





Bilateral hearing loss. 





GERD. Continue omeprazole 20 mg daily.





OA of bilateral knees. Continue tylenol 650 mg BID. 





Glaucoma. Continue brimodine, refresh tears, and latanoprost. 





DVT prophylaxis. On eliquis. 





Overall plan: Continue to monitor HR and BP and titrate medications as 

necessary. Continue to monitor for fevers or hemodynamic instability. Recheck 

labs in the AM.

## 2019-12-29 NOTE — PCM.PN
- General Info


Date of Service: 12/29/19


Subjective Update: 





Patient sitting up in wheelchair feeding herself breakfast during rounds.


Functional Status: Reports: Pain Controlled, Tolerating Diet, Urinating, 

Incentive Spirometry.  Denies: New Symptoms





- Review of Systems


General: Denies: Fatigue


Pulmonary: Reports: Cough.  Denies: Shortness of Breath


Cardiovascular: Denies: Chest Pain


Psychiatric: Reports: No Symptoms





- Patient Data


Vitals - Most Recent: 


 Last Vital Signs











Temp  97.5 F   12/29/19 10:15


 


Pulse  77   12/29/19 10:15


 


Resp  22 H  12/29/19 10:15


 


BP  97/58 L  12/29/19 10:15


 


Pulse Ox  92 L  12/29/19 10:15











Weight - Most Recent: 196 lb


I&O - Last 24 Hours: 


 Intake & Output











 12/28/19 12/29/19 12/29/19





 22:59 06:59 14:59


 


Intake Total 420 50 


 


Balance 420 50 











Lab Results Last 24 Hours: 


 Laboratory Results - last 24 hr











  12/29/19 12/29/19 12/29/19 Range/Units





  07:10 07:10 08:50 


 


WBC  5.93    (5.00-10.00)  10^3/uL


 


RBC  4.61    (3.80-5.50)  10^6/uL


 


Hgb  12.5    (12.0-16.0)  g/dL


 


Hct  40.3    (37.0-47.0)  %


 


MCV  87.4    (82.0-92.0)  fL


 


MCH  27.1    (27.0-31.0)  pg


 


MCHC  31.0 L    (32.0-36.0)  g/dL


 


RDW  15.1 H    (11.5-14.5)  %


 


Plt Count  173    (150-400)  10^3/uL


 


MPV  9.7    (7.4-10.4)  fL


 


Immature Gran % (Auto)  0.2    (0.0-5.0)  %


 


Neut % (Auto)  66.6    (50.0-70.0)  %


 


Lymph % (Auto)  17.0 L    (20.0-40.0)  %


 


Mono % (Auto)  9.4 H    (2.0-8.0)  %


 


Eos % (Auto)  6.1 H    (1.0-3.0)  %


 


Baso % (Auto)  0.7    (0.0-1.0)  %


 


Immature Gran # (Auto)  0.01    (0.00-0.50)  10^3/uL


 


Neut # (Auto)  3.95    (2.50-7.00)  10^3/uL


 


Lymph # (Auto)  1.01    (1.00-4.00)  10^3/uL


 


Mono # (Auto)  0.56    (0.10-0.80)  10^3/uL


 


Eos # (Auto)  0.36 H    (0.10-0.30)  10^3/uL


 


Baso # (Auto)  0.04    (0.00-0.10)  10^3/uL


 


Sodium   169 H* D  143  D  (136-145)  mmol/L


 


Potassium   4.7   (3.3-5.3)  mmol/L


 


Chloride   127 H D  105  D  ()  mmol/L


 


Carbon Dioxide   27.1   (21.0-32.0)  mmol/L


 


Anion Gap   19.6 H   (5-15)  mmol/L


 


BUN   12   (6-25)  mg/dL


 


Creatinine   0.74   (0.51-1.17)  mg/dL


 


Est Cr Clr Drug Dosing   45.14   mL/min


 


Estimated GFR (MDRD)   > 60   mL/min


 


Glucose   90  (75 - 99) mg/dL


 


Calcium   8.8   (8.7-10.3)  mg/dL











Med Orders - Current: 


 Current Medications





Acetaminophen (Tylenol)  650 mg PO BID [facility]


   Last Admin: 12/29/19 09:02 Dose:  650 mg


Albuterol (Proventil Neb Soln)  2.5 mg INH BID PRN


   PRN Reason: Shortness of Breath


Apixaban (Eliquis)  5 mg PO BID [facility]


   Last Admin: 12/29/19 09:02 Dose:  5 mg


Artificial Tears (Refresh Tears 0.5%)  0 ml EYEBOTH TID@0500,1100,2000 [facility]


   Last Admin: 12/29/19 05:10 Dose:  1 drop


Aspirin (Aspirin)  81 mg PO WITHBREAKFAST [facility]


   Last Admin: 12/29/19 09:02 Dose:  81 mg


Atorvastatin Calcium (Lipitor)  20 mg PO BEDTIME [facility]


   Last Admin: 12/28/19 20:23 Dose:  20 mg


Atropine Sulfate (Atropine 0.1 Mg/Ml)  0 mg IVPUSH ASDIRECTED PRN


   PRN Reason: Heart.


Brimonidine Tartrate (Brimonidine Tartrate 0.2% Ophth Soln)  0 ml EYEBOTH BID 

[facility]


   Last Admin: 12/29/19 09:08 Dose:  1 drop


Buspirone HCl (Buspar)  5 mg PO 0800,1600 [facility]


   Last Admin: 12/29/19 09:01 Dose:  5 mg


Cholecalciferol (Vitamin D3)  50 mcg PO DAILY [facility]


   Last Admin: 12/29/19 09:01 Dose:  50 mcg


Epinephrine HCl (Epinephrine 1:10,000)  1 mg IVPUSH ASDIRECTED PRN


   PRN Reason: Heart.


Gabapentin (Neurontin)  100 mg PO DAILY [facility]


   Last Admin: 12/29/19 09:02 Dose:  100 mg


Gabapentin (Neurontin)  200 mg PO BEDTIME [facility]


   Last Admin: 12/28/19 20:25 Dose:  200 mg


Glycopyrrolate/Indacaterol (Utibron Neohaler 27.5-15.6 Mcg)  0 each IH BID [facility]


   Last Admin: 12/29/19 09:09 Dose:  1 inhalation


Lactobacillus Acidophilus/Rhamnosus (Multi-Dyana Plus)  1 cap PO DAILY [facility]


   Last Admin: 12/29/19 09:01 Dose:  1 cap


Latanoprost (Xalatan 0.005% Ophth Soln)  0 ml EYEBOTH BEDTIME [facility]


   Last Admin: 12/28/19 20:30 Dose:  1 drop


Levothyroxine Sodium (Synthroid)  50 mcg PO ACBREAKFAST [facility]


   Last Admin: 12/29/19 07:28 Dose:  50 mcg


Lidocaine HCl (Xylocaine 2%)  0 mg IVPUSH ASDIRECTED PRN


   PRN Reason: Heart.


Magnesium Hydroxide (Milk Of Magnesia)  30 ml PO DAILY PRN


   PRN Reason: Constipation


Metoprolol Tartrate (Lopressor)  25 mg PO BID [facility]


   Last Admin: 12/29/19 09:02 Dose:  25 mg


Multivitamins/Minerals (Centrum)  1 tab PO DAILY [facility]


   Last Admin: 12/29/19 09:02 Dose:  1 tab


Nitroglycerin (Nitrostat)  0.4 mg SL ASDIRECTED PRN


   PRN Reason: Heart.


Omeprazole (Omeprazole)  20 mg PO ACBREAKFAST [facility]


   Last Admin: 12/29/19 07:28 Dose:  20 mg


Sertraline HCl (Zoloft)  50 mg PO DAILY [facility]


   Last Admin: 12/29/19 09:02 Dose:  50 mg


Sodium Chloride (Saline Flush)  10 ml FLUSH Q8HR PRN


   PRN Reason: keep vein open


   Last Admin: 12/27/19 20:14 Dose:  10 ml


Trazodone HCl (Trazodone)  50 mg PO BEDTIME [facility]


   Last Admin: 12/28/19 20:23 Dose:  50 mg





Discontinued Medications





Artificial Tears (Liquitears 1.4% Ophth Soln)  0 ml EYEBOTH TID@05,11,20 [facility]


   Last Admin: 12/28/19 05:26 Dose:  Not Given


Buspirone HCl (Buspar)  5 mg PO BID [facility]


   Last Admin: 12/27/19 20:59 Dose:  5 mg


Losartan Potassium (Cozaar)  75 mg PO DAILY [facility]


   Last Admin: 12/28/19 09:32 Dose:  Not Given


Metoprolol Tartrate (Lopressor)  5 mg IVPUSH ONETIME ONE


   Stop: 12/27/19 14:53


   Last Admin: 12/27/19 16:38 Dose:  5 mg


Metoprolol Tartrate (Lopressor)  5 mg IVPUSH ONETIME ONE


   Stop: 12/27/19 18:39


   Last Admin: 12/27/19 18:54 Dose:  5 mg


Metoprolol Tartrate (Lopressor)  5 mg IVPUSH Q1H PRN


   PRN Reason: Tachycardia


Metoprolol Tartrate (Lopressor)  5 mg IVPUSH ONETIME ONE


   Stop: 12/27/19 19:30


   Last Admin: 12/27/19 20:12 Dose:  5 mg


Non-Formulary Medication (Benzocaine [Orajel])  1 applic BUCCAL TID PRN


   PRN Reason: sore gums


Non-Formulary Medication (Guaifenesin [Liquituss Gg])  10 ml PO BID PRN


   PRN Reason: Cough


Non-Formulary Medication (Pantoprazole Sodium [Protonix])  20 mg PO DAILY@0500 

[facility]











- Exam


Quality Assessment: DVT Prophylaxis (on eliquis).  No: Supplemental Oxygen


General: Alert, Oriented (to self), Cooperative, No Acute Distress


Lungs: Clear to Auscultation, Normal Respiratory Effort


Cardiovascular: Regular Rate, No Murmurs, Irregular Rhythm


Extremities: Other (trace pitting edema to BLE)


Skin: Warm, Dry


Neurological: Normal Speech


Psy/Mental Status: Alert, Normal Affect, Normal Mood





Sepsis Event Note





- Evaluation


Sepsis Screening Result: Sepsis Risk





- Focused Exam


Vital Signs: 


 Vital Signs











  Temp Pulse Pulse Resp BP BP Pulse Ox


 


 12/29/19 10:15  97.5 F   77  22 H   97/58 L  92 L


 


 12/29/19 09:02   95    112/70  


 


 12/29/19 06:50  97.4 F   76  16   99/59 L  93 L


 


 12/29/19 03:00  96.5 F   68  18   105/60  93 L


 


 12/28/19 23:00  97.3 F   104 H  18   101/60  90 L











Date Exam was Performed: 12/29/19


Time Exam was Performed: 10:55





- Problem List Review


Problem List Initiated/Reviewed/Updated: Yes





- My Orders


Last 24 Hours: 


My Active Orders





12/28/19 11:00


Carboxymethylcellulose Sodium [Refresh Tears 0.5%]   0 ml EYEBOTH TID@0500,1100,

2000 





12/28/19 11:02


Incentive Spirometry [RT Incentive Spirometry] [RC] Q2HWA 





12/29/19 05:26


Communication Order [RC] 0900,2100 














- Plan


Plan:: 





HPI: This is an 85 yo female who was evaluated in the clinic yesterday due to 

shortness of breath, weakness, and tachycardia. NH reported that patient had 

two emesis on 12/24 and 12/25/19 and the following day she required oxygen for 

O2 sat of 88-89%. Reported to have a congested cough, diminished lung sounds, 

and tachycardia. Weight down 5 lbs in approximately 2 weeks. Patient was found 

to be in new onset atrial flutter/fib with concern for aspiration pneumonia. 

She was subsequently admitted for further work-up and treatment. She is a 

resident of the [facility]. 





Pertinent Clinic Work-up: 


WBC 5.7 w/o neutrophilia


CXR noted radiographically mild fluid overload or failure


ESR 31


EKG atrial flutter-fibrillation with RVR (113 bpm), LVH, inferior and 

anteroseptal infarcts-age undetermined





________________________________________________________________________________

_____________________________________________________





Overnight update: HR between  bpm in past 24 hours. She has remained 

afebrile and has not required oxygen. 





Primary Impression/Plan: 





Atrial flutter-fibrillation, new onset, improving. In the last 12 hours, 

patient has had improvement in heart rate into the 70s with one time episode of 

104 bpm. BP 97-100s systolic. Continue telemetry monitoring. Continue lopressor 

25 mg BID. [id number] score 7. Continue eliquis 5 mg BID. 





Tachypnea, likely related to tachycardia, improving. WBC 5.9 w/o neutrophilia. 

Procalcitonin 0.04. CXR not showing infiltrate. . Does not appear to be 

clinically fluid overloaded. Troponin 0.04. Will continue to monitor. Does have 

underlying COPD. Continue incentive spirometry. 





Primary hypothyroidism, new onset. TSH 78, Free T4 <0.02, T3 pending. Continue 

levothyroxine. Repeat levels in 8 weeks. 





Secondary Impression/Plan: 





COPD. Continue albuterol neb PRN, Utibron Neohaler (therapeutic substitution). 





HTN. Continue holding losartan and likely will discontinue this on discharge. 





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 Decreased 

home aspirin to 81 mg daily given addition of eliquis. Will require referral to 

anti-coag clinic on discharge. 





Chronic diastolic CHF. ECHO (2015) EF 60%, normal systolic function, mild 

diastolic dysfunction. Not on any diuretics. 





HLD. Continue lipitor 40 mg daily. LDL 93 (12/2019). 





LYNN. Continue buspar 5 mg BID, zoloft 50 mg daily. 





Panic disorder. 





Psychophysiological insomnia. Continue trazodone 50 mg at HS. 





Normocytic anemia, history. 





Postherpetic neuralgia. Continue gabapentin 100 mg AM and 200 mg PM. 





Bilateral hearing loss. 





GERD. Continue omeprazole 20 mg daily (therapeutic substitution). 





OA of bilateral knees. Continue tylenol 650 mg BID. 





Glaucoma. Continue brimodine, refresh tears, and latanoprost. 





DVT prophylaxis. On eliquis. 





Overall plan: Given slight increases in heart rate at times, patient will 

benefit from one more day of telemetry monitoring and will adjust lopressor if 

HR persistently >90 bpm. Likely will be able to be discharged back to SNF 

tomorrow. Nurse updated NH on plan of care.

## 2019-12-30 NOTE — PCM.PN
- General Info


Date of Service: 12/30/19


Functional Status: Reports: Tolerating Diet, Urinating, New Symptoms (left 

lower abdominal pain)





- Review of Systems


General: Reports: Fatigue.  Denies: Fever


HEENT: Denies: Headaches


Pulmonary: Denies: Shortness of Breath, Cough


Cardiovascular: Denies: Chest Pain


Gastrointestinal: Reports: Abdominal Pain.  Denies: Constipation, Decreased 

Appetite, Diarrhea, Nausea, Vomiting





- Patient Data


Vitals - Most Recent: 


 Last Vital Signs











Temp  96.9 F   12/30/19 06:59


 


Pulse  80   12/30/19 08:59


 


Resp  24 H  12/30/19 06:59


 


BP  116/64   12/30/19 08:59


 


Pulse Ox  97   12/30/19 06:59











Weight - Most Recent: 196 lb


I&O - Last 24 Hours: 


 Intake & Output











 12/29/19 12/30/19 12/30/19





 22:59 06:59 14:59


 


Intake Total 500 250 


 


Balance 500 250 











Lab Results Last 24 Hours: 


 Laboratory Results - last 24 hr











  12/30/19 12/30/19 Range/Units





  06:05 06:05 


 


WBC  6.65   (5.00-10.00)  10^3/uL


 


RBC  4.57   (3.80-5.50)  10^6/uL


 


Hgb  12.7   (12.0-16.0)  g/dL


 


Hct  39.7   (37.0-47.0)  %


 


MCV  86.9   (82.0-92.0)  fL


 


MCH  27.8   (27.0-31.0)  pg


 


MCHC  32.0   (32.0-36.0)  g/dL


 


RDW  15.0 H   (11.5-14.5)  %


 


Plt Count  182   (150-400)  10^3/uL


 


MPV  10.1   (7.4-10.4)  fL


 


Immature Gran % (Auto)  0.2   (0.0-5.0)  %


 


Neut % (Auto)  71.1 H   (50.0-70.0)  %


 


Lymph % (Auto)  14.4 L   (20.0-40.0)  %


 


Mono % (Auto)  9.0 H   (2.0-8.0)  %


 


Eos % (Auto)  4.7 H   (1.0-3.0)  %


 


Baso % (Auto)  0.6   (0.0-1.0)  %


 


Immature Gran # (Auto)  0.01   (0.00-0.50)  10^3/uL


 


Neut # (Auto)  4.73   (2.50-7.00)  10^3/uL


 


Lymph # (Auto)  0.96 L   (1.00-4.00)  10^3/uL


 


Mono # (Auto)  0.60   (0.10-0.80)  10^3/uL


 


Eos # (Auto)  0.31 H   (0.10-0.30)  10^3/uL


 


Baso # (Auto)  0.04   (0.00-0.10)  10^3/uL


 


Sodium   142  (136-145)  mmol/L


 


Potassium   3.8  (3.3-5.3)  mmol/L


 


Chloride   105  ()  mmol/L


 


Carbon Dioxide   26.4  (21.0-32.0)  mmol/L


 


Anion Gap   14.4  (5-15)  mmol/L


 


BUN   12  (6-25)  mg/dL


 


Creatinine   0.71  (0.51-1.17)  mg/dL


 


Est Cr Clr Drug Dosing   47.05  mL/min


 


Estimated GFR (MDRD)   > 60  mL/min


 


Glucose   94 (75 - 99) mg/dL


 


Calcium   8.8  (8.7-10.3)  mg/dL


 


Troponin I   < 0.04  (0.00-0.070)  ng/mL


 


B-Natriuretic Peptide   457 H  (0-100)  pg/mL











Med Orders - Current: 


 Current Medications





Acetaminophen (Tylenol)  650 mg PO BID [facility]


   Last Admin: 12/30/19 08:52 Dose:  650 mg


Albuterol (Proventil Neb Soln)  2.5 mg INH BID PRN


   PRN Reason: Shortness of Breath


   Last Admin: 12/30/19 05:10 Dose:  2.5 mg


Apixaban (Eliquis)  5 mg PO BID [facility]


   Last Admin: 12/30/19 08:52 Dose:  5 mg


Artificial Tears (Refresh Tears 0.5%)  0 ml EYEBOTH TID@0500,1100,2000 [facility]


   Last Admin: 12/30/19 05:13 Dose:  1 drop


Aspirin (Aspirin)  81 mg PO WITHBREAKFAST [facility]


   Last Admin: 12/30/19 08:05 Dose:  81 mg


Atorvastatin Calcium (Lipitor)  20 mg PO BEDTIME [facility]


   Last Admin: 12/29/19 20:42 Dose:  20 mg


Atropine Sulfate (Atropine 0.1 Mg/Ml)  0 mg IVPUSH ASDIRECTED PRN


   PRN Reason: Heart.


Brimonidine Tartrate (Brimonidine Tartrate 0.2% Ophth Soln)  0 ml EYEBOTH BID 

[facility]


   Last Admin: 12/30/19 08:58 Dose:  1 drop


Buspirone HCl (Buspar)  5 mg PO 0800,1600 [facility]


   Last Admin: 12/30/19 08:05 Dose:  5 mg


Cholecalciferol (Vitamin D3)  50 mcg PO DAILY [facility]


   Last Admin: 12/30/19 08:58 Dose:  50 mcg


Epinephrine HCl (Epinephrine 1:10,000)  1 mg IVPUSH ASDIRECTED PRN


   PRN Reason: Heart.


Gabapentin (Neurontin)  100 mg PO DAILY [facility]


   Last Admin: 12/30/19 08:58 Dose:  100 mg


Gabapentin (Neurontin)  200 mg PO BEDTIME [facility]


   Last Admin: 12/29/19 20:47 Dose:  200 mg


Glycopyrrolate/Indacaterol (Utibron Neohaler 27.5-15.6 Mcg)  0 each IH BID [facility]


   Last Admin: 12/30/19 09:02 Dose:  1 inhalation


Lactobacillus Acidophilus/Rhamnosus (Multi-Dyana Plus)  1 cap PO DAILY [facility]


   Last Admin: 12/30/19 08:52 Dose:  1 cap


Latanoprost (Xalatan 0.005% Ophth Soln)  0 ml EYEBOTH BEDTIME [facility]


   Last Admin: 12/29/19 20:50 Dose:  1 drop


Levothyroxine Sodium (Synthroid)  50 mcg PO ACBREAKFAST [facility]


   Last Admin: 12/30/19 07:46 Dose:  50 mcg


Lidocaine HCl (Xylocaine 2%)  0 mg IVPUSH ASDIRECTED PRN


   PRN Reason: Heart.


Magnesium Hydroxide (Milk Of Magnesia)  30 ml PO DAILY PRN


   PRN Reason: Constipation


Metoprolol Tartrate (Lopressor)  37.5 mg PO BID [facility]


   Last Admin: 12/30/19 08:59 Dose:  37.5 mg


Multivitamins/Minerals (Centrum)  1 tab PO DAILY [facility]


   Last Admin: 12/30/19 08:58 Dose:  1 tab


Nitroglycerin (Nitrostat)  0.4 mg SL ASDIRECTED PRN


   PRN Reason: Heart.


Omeprazole (Omeprazole)  20 mg PO ACBREAKFAST [facility]


   Last Admin: 12/30/19 07:46 Dose:  20 mg


Sertraline HCl (Zoloft)  50 mg PO DAILY [facility]


   Last Admin: 12/30/19 08:58 Dose:  50 mg


Sodium Chloride (Saline Flush)  10 ml FLUSH Q8HR PRN


   PRN Reason: keep vein open


   Last Admin: 12/27/19 20:14 Dose:  10 ml


Trazodone HCl (Trazodone)  50 mg PO BEDTIME [facility]


   Last Admin: 12/29/19 20:43 Dose:  50 mg





Discontinued Medications





Artificial Tears (Liquitears 1.4% Ophth Soln)  0 ml EYEBOTH TID@05,11,20 [facility]


   Last Admin: 12/28/19 05:26 Dose:  Not Given


Buspirone HCl (Buspar)  5 mg PO BID [facility]


   Last Admin: 12/27/19 20:59 Dose:  5 mg


Furosemide (Lasix)  20 mg IVPUSH NOW ONE


   Stop: 12/30/19 07:34


   Last Admin: 12/30/19 07:46 Dose:  20 mg


Losartan Potassium (Cozaar)  75 mg PO DAILY [facility]


   Last Admin: 12/28/19 09:32 Dose:  Not Given


Metoprolol Tartrate (Lopressor)  5 mg IVPUSH ONETIME ONE


   Stop: 12/27/19 14:53


   Last Admin: 12/27/19 16:38 Dose:  5 mg


Metoprolol Tartrate (Lopressor)  5 mg IVPUSH ONETIME ONE


   Stop: 12/27/19 18:39


   Last Admin: 12/27/19 18:54 Dose:  5 mg


Metoprolol Tartrate (Lopressor)  5 mg IVPUSH Q1H PRN


   PRN Reason: Tachycardia


Metoprolol Tartrate (Lopressor)  5 mg IVPUSH ONETIME ONE


   Stop: 12/27/19 19:30


   Last Admin: 12/27/19 20:12 Dose:  5 mg


Metoprolol Tartrate (Lopressor)  25 mg PO BID [facility]


   Last Admin: 12/29/19 09:02 Dose:  25 mg


Non-Formulary Medication (Benzocaine [Orajel])  1 applic BUCCAL TID PRN


   PRN Reason: sore gums


Non-Formulary Medication (Guaifenesin [Liquituss Gg])  10 ml PO BID PRN


   PRN Reason: Cough


Non-Formulary Medication (Pantoprazole Sodium [Protonix])  20 mg PO DAILY@0500 

[facility]











- Exam


Quality Assessment: Supplemental Oxygen (2 liters per nasal cannula for comfort

, 97%), DVT Prophylaxis (On eliquis).  No: Urine Catheter


General: Alert, Oriented (to self), Cooperative, No Acute Distress


Lungs: Normal Respiratory Effort, Decreased Breath Sounds (throughout lung 

fields), Crackles (LLL)


Cardiovascular: Regular Rate, Irregular Rhythm, Murmurs (2/6, systolic over 

aorta and pulmonic area)


GI/Abdominal Exam: Soft, No Distention, Hernia (LLQ, reducible, tender)


Extremities: Other (trace edema to BLE)


Skin: Warm, Dry, Intact


Neurological: Normal Speech, Other (left sided hemiplegia)


Psy/Mental Status: Alert, Normal Affect, Normal Mood





Sepsis Event Note





- Evaluation


Sepsis Screening Result: No Definite Risk





- Focused Exam


Vital Signs: 


 Vital Signs











  Temp Temp Pulse Pulse Resp BP BP


 


 12/30/19 08:59    80    116/64 


 


 12/30/19 06:59   96.9 F   78  24 H   102/72


 


 12/30/19 03:00  96.4 F    102 H  16   145/94 H


 


 12/29/19 23:00  97.0 F    73  20   107/74














  Pulse Ox


 


 12/30/19 08:59 


 


 12/30/19 06:59  97


 


 12/30/19 03:00  93 L


 


 12/29/19 23:00  93 L











Date Exam was Performed: 12/30/19


Time Exam was Performed: 11:04





- Problem List Review


Problem List Initiated/Reviewed/Updated: Yes





- My Orders


Last 24 Hours: 


My Active Orders





12/29/19 21:00


Metoprolol Tartrate [Lopressor]   37.5 mg PO BID 





12/30/19 08:13


Intake and Output [RC] 1400,2200,0600 


Weight Daily [Height and Weight] [RC] DAILY 














- Plan


Plan:: 





HPI: This is an 85 yo female who was evaluated in the clinic yesterday (12/27/19

) due to shortness of breath, weakness, and tachycardia. NH reported that 

patient had two emesis on 12/24 and 12/25/19 and the following day she required 

oxygen for O2 sat of 88-89%. Reported to have a congested cough, diminished 

lung sounds, and tachycardia. Weight down 5 lbs in approximately 2 weeks. 

Patient was found to be in new onset atrial flutter/fib with concern for 

aspiration pneumonia. She was subsequently admitted for further work-up and 

treatment. She is a resident of the [facility]. 





Pertinent Clinic Work-up: 


WBC 5.7 w/o neutrophilia


CXR noted radiographically mild fluid overload or failure


ESR 31


EKG atrial flutter-fibrillation with RVR (113 bpm), LVH, inferior and 

anteroseptal infarcts-age undetermined





________________________________________________________________________________

_____________________________________________________





Overnight update: While in house last evening, HR noted to be 110-120 bpm at 

rest on telemetry monitor. Increased lopressor to 37.5 mg BID. Received call 

around 0530 d/t tachypnea, tachycardia, and 7 runs of Vtach (after albuterol neb

). Nurse noted new onset crackles. O2 sat 90-92% on room air. Patient reporting 

not being able to catch her breath. Orders given for lab and CXR. 





Primary Impression/Plan: 





Acute diastolic CHF exacerbation. . Troponin <0.04. WBC 6.65 with left 

shift. BMP unremarkable. CXR noted mild central vascular congestion. Lasix 20 

mg IV x 1 given. Add daily weights, I&O. ECHO (2015) EF 60%, normal systolic 

function, mild diastolic dysfunction. Will need outpatient ECHO. BMP in AM. 





Systolic murmur, new onset. 2/6 heard predominantly over aortic and pulmonic 

area. 





Atrial flutter-fibrillation, new onset. Continue lopressor and Eliquis. 

Continue telemetry. [id number] score 7. 





Tachypnea, likely related CHF exacerbation. Continue incentive spirometry. Does 

have underlying COPD and uses 1-2 liters of oxygen PRN at baseline. 





LLQ abdominal pain, question reducible hernia. Will continue to monitor. 

Adequate appetite and urine/stool output. No N/V. 





Primary hypothyroidism, new onset. TSH 78, Free T4 <0.02, T3 pending. Continue 

levothyroxine. Repeat levels in 8 weeks. 





Secondary Impression/Plan: 





COPD. Continue albuterol neb PRN, Utibron Neohaler (therapeutic substitution). 





HTN. Continue holding losartan and likely will discontinue this on discharge. 





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 Decreased 

home aspirin to 81 mg daily given addition of eliquis. Will require referral to 

anti-coag clinic on discharge. 





HLD. Continue lipitor 40 mg daily. LDL 93 (12/2019). 





LYNN. Continue buspar 5 mg BID, zoloft 50 mg daily. 





Panic disorder. 





Psychophysiological insomnia. Continue trazodone 50 mg at HS. 





Normocytic anemia, history. 





Postherpetic neuralgia. Continue gabapentin 100 mg AM and 200 mg PM. 





Bilateral hearing loss. 





GERD. Continue omeprazole 20 mg daily (therapeutic substitution). 





OA of bilateral knees. Continue tylenol 650 mg BID. 





Glaucoma. Continue brimodine, refresh tears, and latanoprost. 





DVT prophylaxis. On eliquis. 





Overall plan: Due to CHF exacerbation and run of Vtach, patient will require at 

least 24 hours more of monitoring. BMP in AM. May be able to be discharged back 

to SNF tomorrow pending clinical picture. She will need a referral to the [facility] 

monitoring clinic through [place] on discharge.

## 2019-12-30 NOTE — CR
______________________________________________________________________________   

  

[id number] RAD/RAD Chest PA or AP 1V  

EXAM:  RAD Chest PA or AP 1V  

   

 INDICATION:  SOB/DYSPNEA  

   

 COMPARISON:  April 2017.  

   

 DISCUSSION:    

   

 Cardiomegaly and central vascular congestion. Findings are nonspecific but can  

 be seen with early changes of congestive heart failure exacerbation. No other  

 significant abnormality.  

   

 IMPRESSION:  

 As above.  

   

 Electronically signed by Bert Suarez MD on 12/30/2019 6:59 AM  

   

  

Bert Suarez MD                 

 12/30/19 0701    

  

Thank you for allowing us to participate in the care of your patient.

## 2019-12-31 NOTE — [id number]
**Discharge Summary





- Hospital Course


Diagnosis: Stroke: No





- Discharge Data


Discharge Date: 12/31/19


Discharge Disposition: DC/Tfer to SNF [facility]


Condition: Good





- Referral to Home Health


Primary Care Physician: 


Harper Ko PA-C








- Patient Instructions


Diet: Pureed


Activity: As Tolerated, Cough & Deep Breathe


Showering/Bathing: May Shower


Notify Provider of: Fever, Nausea and/or Vomiting


Other/Special Instructions: A referral has been placed through [facility] for 

blood thinner clinic to monitor her Eliquis.  ECHO ordered through [facility] (

[facility]).  Weigh weekly--report weight >4lbs.  Have DC summary available 

for rounding provider.





- Discharge Plan


*PRESCRIPTION DRUG MONITORING PROGRAM REVIEWED*: Not Applicable


*COPY OF PRESCRIPTION DRUG MONITORING REPORT IN PATIENT SHEILA: Not Applicable


Prescriptions/Med Rec: 


Apixaban [Eliquis] 5 mg PO BID #60 tablet


Aspirin 81 mg PO WITHBREAKFAST #30 tab.chew


Levothyroxine [Synthroid] 50 mcg PO ACBREAKFAST #30 tablet


Losartan [Cozaar] 25 mg PO DAILY #30 tab


Metoprolol Tartrate [Lopressor] 37.5 mg PO BID #30 tablet


Home Medications: 


 Home Meds





Cholecalciferol (Vitamin D3) [Vitamin D3] 2,000 units PO DAILY 05/08/15 [History

]


Latanoprost 1 drop EYEBOTH BEDTIME 05/08/15 [History]


Acetaminophen 650 mg PO BID 01/26/18 [History]


Brimonidine Tartrate [Brimonidine Tartrate 0.2% Ophth Soln] 1 drop EYEBOTH BID 

01/26/18 [History]


L.acidoph,Paracasei, B.lactis [Probiotic] 1 each PO DAILY 01/26/18 [History]


Loperamide HCl [Imodium A-D] 2 mg PO DAILY PRN 01/26/18 [History]


Magnesium Hydroxide [Milk of Magnesia] 30 ml PO DAILY PRN 01/26/18 [History]


Multivitamin [Multivitamins] 1 each PO DAILY 01/26/18 [History]


atorvaSTATin [Lipitor] 20 mg PO BEDTIME 01/26/18 [History]


busPIRone [Buspar] 5 mg PO BID 01/26/18 [History]


traZODone HCl [Trazodone HCl] 50 mg PO BEDTIME 01/26/18 [History]


Albuterol [Proventil Neb Soln] 3 ml INH BID PRN 12/27/19 [History]


Benzocaine [Orajel] 1 applic BUCCAL TID PRN 12/27/19 [History]


Carboxymethylcell/Glycerin/Pf [Refresh Optive Sensitive Drops] 1 drop EYEBOTH 

TID@05,11,20 12/27/19 [History]


Eucalyptus Oil/Menthol/Camphor [Vicks Vaporub Ointment] 1 applic TOP Q12H PRN 12 /27/19 [History]


Gabapentin [Neurontin] 100 mg PO DAILY 12/27/19 [History]


Gabapentin [Neurontin] 200 mg PO BEDTIME 12/27/19 [History]


Pantoprazole Sodium [Protonix] 20 mg PO DAILY@0500 12/27/19 [History]


Sertraline [Zoloft] 50 mg PO DAILY 12/27/19 [History]


Umeclidinium Brm/Vilanterol Tr [Anoro Ellipta 62.5-25 MCG] 1 puff INH DAILY 12/ 27/19 [History]


guaiFENesin [Liquituss GG] 10 ml PO BID PRN 12/27/19 [History]


Apixaban [Eliquis] 5 mg PO BID #60 tablet 12/31/19 [Rx]


Aspirin 81 mg PO WITHBREAKFAST #30 tab.chew 12/31/19 [Rx]


Levothyroxine [Synthroid] 50 mcg PO ACBREAKFAST #30 tablet 12/31/19 [Rx]


Losartan [Cozaar] 25 mg PO DAILY #30 tab 12/31/19 [Rx]


Metoprolol Tartrate [Lopressor] 37.5 mg PO BID #30 tablet 12/31/19 [Rx]











- Discharge Summary/Plan Comment


DC Time >30 min.: Yes


Discharge Summary/Plan Comment: 


Final diagnosis


--Acute diastolic CHF exacerbation. 


--Systolic murmur, new onset. 


--Atrial flutter-fibrillation, new onset. Continue lopressor and Eliquis. 

[id number] score 7. 


--Primary hypothyroidism, new onset. TSH 78, Free T4 <0.02, T3 pending upon DC 

newly started levothyroxine. Repeat levels in 8 weeks. 








Chronic/stable problems


--COPD. Continue albuterol neb PRN, 


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 Decreased 

home jASA to 81 mg daily given addition of eliquis.  


--HLD. Continue lipitor 40 mg daily. LDL 93 (12/2019). 


--LYNN. Continue buspar 5 mg BID, zoloft 50 mg daily. 


--Panic disorder. 


--Psychophysiological insomnia. Continue trazodone 50 mg at HS. 


--Normocytic anemia, history. 


--Postherpetic neuralgia. Continue gabapentin 100 mg AM and 200 mg PM. 


--Bilateral hearing loss. 


--GERD. PPI  


--OA of bilateral knees. Continue tylenol 650 mg BID. 


--Glaucoma. Continue brimodine, refresh tears, and latanoprost. 








Pre-Hospital history


85 yo female who is a resident of the [facility] was 

evaluated in clinic on 12/27/19 2/2 shortness of breath, weakness, and 

tachycardia. NH reported that patient had two emesis on 12/24 and 12/25/19 and 

the following day she required oxygen for O2 sat of 88-89%. Reported to have a 

congested cough, diminished lung sounds, and tachycardia. Weight down 5 lbs in 

approximately 2 weeks frame per report.  For clinic the patient was noted to be 

new onset atrial flutter/fib with concern for aspiration pneumonia. She was 

subsequently admitted for further work-up and treatment. 





Pertinent Clinic Work-up: 


WBC 5.7 w/o neutrophilia


CXR noted radiographically mild fluid overload or failure


ESR 31


EKG atrial flutter-fibrillation with RVR (113 bpm), LVH, inferior and 

anteroseptal infarcts-age undetermined











Hospital Course: 


But complicated hospital course as a-flutter typically more difficult to 

control rate versus atrial fibrillation.  Patient was given increased some of 

Lopressor with fairly controlled rates however she did have runs of V. tach 

shortness of breath mainly after her beta-2 agonist albuterol was given.  Have 

exacerbation of her CHF seem to respond with Lasix.  She was started on factor 

Xa inhibitor Eliquis p.o. twice daily which she tolerated well signs of 

complication, home aspirin due to CVA was decreased to 81 mg on discharge.  

Oxygen was titrated down however she will be discharged on 1 L. 














Medications changes/adjustments upon discharge


Eliquis 5 mg p.o. twice daily, (newly added)


Metoprolol tartrate, 37.5 mg p.o. BID, (newly added)


Levothyroxine 50 mcg daily, (newly added)


Losartan decreased to 25 mg 


ASA, decreased to 81 mg due to added factor Xa inhibitor





Disposition/overall plan


--Resident will be discharged back to long-term care


--Referral for coagulation clinic placed


--Echocardiogram outpatient, placed


--Oxygen 1 L/min NC TKS 90-93% 








- Patient Data


Vitals - Most Recent: 


 Last Vital Signs











Temp  96.1 F   12/31/19 06:52


 


Pulse  95   12/31/19 08:30


 


Resp  20   12/31/19 06:52


 


BP  124/66   12/31/19 08:30


 


Pulse Ox  94 L  12/31/19 06:52











Weight - Most Recent: 192 lb 2 oz


I&O - Last 24 hours: 


 Intake & Output











 12/30/19 12/31/19 12/31/19





 22:59 06:59 14:59


 


Intake Total 150 50 


 


Balance 150 50 











Lab Results - Last 24 hrs: 


 Laboratory Results - last 24 hr











  12/31/19 Range/Units





  07:15 


 


Sodium  143  (136-145)  mmol/L


 


Potassium  3.8  (3.3-5.3)  mmol/L


 


Chloride  104  ()  mmol/L


 


Carbon Dioxide  28.0  (21.0-32.0)  mmol/L


 


Anion Gap  14.8  (5-15)  mmol/L


 


BUN  12  (6-25)  mg/dL


 


Creatinine  0.84  (0.51-1.17)  mg/dL


 


Est Cr Clr Drug Dosing  39.77  mL/min


 


Estimated GFR (MDRD)  > 60  mL/min


 


Glucose  87 (75 - 99) mg/dL


 


Calcium  8.6 L  (8.7-10.3)  mg/dL











Med Orders - Current: 


 Current Medications





Acetaminophen (Tylenol)  650 mg PO BID formerly [facility]


   Last Admin: 12/31/19 08:14 Dose:  650 mg


Albuterol (Proventil Neb Soln)  2.5 mg INH BID PRN


   PRN Reason: Shortness of Breath


   Last Admin: 12/30/19 05:10 Dose:  2.5 mg


Apixaban (Eliquis)  5 mg PO BID formerly [facility]


   Last Admin: 12/31/19 08:13 Dose:  5 mg


Artificial Tears (Refresh Tears 0.5%)  0 ml EYEBOTH TID@0500,1100,2000 formerly [facility]


   Last Admin: 12/31/19 05:39 Dose:  1 drop


Aspirin (Aspirin)  81 mg PO WITHBREAKFAST formerly [facility]


   Last Admin: 12/31/19 08:12 Dose:  81 mg


Atorvastatin Calcium (Lipitor)  20 mg PO BEDTIME formerly [facility]


   Last Admin: 12/30/19 20:12 Dose:  20 mg


Atropine Sulfate (Atropine 0.1 Mg/Ml)  0 mg IVPUSH ASDIRECTED PRN


   PRN Reason: Heart.


Brimonidine Tartrate (Brimonidine Tartrate 0.2% Ophth Soln)  0 ml EYEBOTH BID 

formerly [facility]


   Last Admin: 12/31/19 09:28 Dose:  1 drop


Buspirone HCl (Buspar)  5 mg PO 0800,1600 formerly [facility]


   Last Admin: 12/31/19 08:12 Dose:  5 mg


Cholecalciferol (Vitamin D3)  50 mcg PO DAILY formerly [facility]


   Last Admin: 12/31/19 08:14 Dose:  50 mcg


Epinephrine HCl (Epinephrine 1:10,000)  1 mg IVPUSH ASDIRECTED PRN


   PRN Reason: Heart.


Gabapentin (Neurontin)  100 mg PO DAILY formerly [facility]


   Last Admin: 12/31/19 08:13 Dose:  100 mg


Gabapentin (Neurontin)  200 mg PO BEDTIME formerly [facility]


   Last Admin: 12/30/19 20:13 Dose:  200 mg


Glycopyrrolate/Indacaterol (Utibron Neohaler 27.5-15.6 Mcg)  0 each IH BID formerly [facility]


   Last Admin: 12/31/19 09:28 Dose:  1 inhalation


Lactobacillus Acidophilus/Rhamnosus (Multi-Dyana Plus)  1 cap PO DAILY formerly [facility]


   Last Admin: 12/31/19 08:13 Dose:  1 cap


Latanoprost (Xalatan 0.005% Ophth Soln)  0 ml EYEBOTH BEDTIME formerly [facility]


   Last Admin: 12/30/19 20:35 Dose:  1 drop


Levothyroxine Sodium (Synthroid)  50 mcg PO ACBREAKFAST formerly [facility]


   Last Admin: 12/31/19 08:11 Dose:  50 mcg


Lidocaine HCl (Xylocaine 2%)  0 mg IVPUSH ASDIRECTED PRN


   PRN Reason: Heart.


Magnesium Hydroxide (Milk Of Magnesia)  30 ml PO DAILY PRN


   PRN Reason: Constipation


Metoprolol Tartrate (Lopressor)  37.5 mg PO BID formerly [facility]


   Last Admin: 12/31/19 08:30 Dose:  37.5 mg


Multivitamins/Minerals (Centrum)  1 tab PO DAILY formerly [facility]


   Last Admin: 12/31/19 08:13 Dose:  1 tab


Nitroglycerin (Nitrostat)  0.4 mg SL ASDIRECTED PRN


   PRN Reason: Heart.


Omeprazole (Omeprazole)  20 mg PO ACBREAKFAST formerly [facility]


   Last Admin: 12/31/19 08:11 Dose:  20 mg


Sertraline HCl (Zoloft)  50 mg PO DAILY formerly [facility]


   Last Admin: 12/31/19 08:15 Dose:  50 mg


Sodium Chloride (Saline Flush)  10 ml FLUSH Q8HR PRN


   PRN Reason: keep vein open


   Last Admin: 12/27/19 20:14 Dose:  10 ml


Trazodone HCl (Trazodone)  50 mg PO BEDTIME formerly [facility]


   Last Admin: 12/30/19 20:13 Dose:  50 mg





Discontinued Medications





Artificial Tears (Liquitears 1.4% Ophth Soln)  0 ml EYEBOTH TID@05,11,20 formerly [facility]


   Last Admin: 12/28/19 05:26 Dose:  Not Given


Buspirone HCl (Buspar)  5 mg PO BID formerly [facility]


   Last Admin: 12/27/19 20:59 Dose:  5 mg


Furosemide (Lasix)  20 mg IVPUSH NOW ONE


   Stop: 12/30/19 07:34


   Last Admin: 12/30/19 07:46 Dose:  20 mg


Losartan Potassium (Cozaar)  75 mg PO DAILY formerly [facility]


   Last Admin: 12/28/19 09:32 Dose:  Not Given


Metoprolol Tartrate (Lopressor)  5 mg IVPUSH ONETIME ONE


   Stop: 12/27/19 14:53


   Last Admin: 12/27/19 16:38 Dose:  5 mg


Metoprolol Tartrate (Lopressor)  5 mg IVPUSH ONETIME ONE


   Stop: 12/27/19 18:39


   Last Admin: 12/27/19 18:54 Dose:  5 mg


Metoprolol Tartrate (Lopressor)  5 mg IVPUSH Q1H PRN


   PRN Reason: Tachycardia


Metoprolol Tartrate (Lopressor)  5 mg IVPUSH ONETIME ONE


   Stop: 12/27/19 19:30


   Last Admin: 12/27/19 20:12 Dose:  5 mg


Metoprolol Tartrate (Lopressor)  25 mg PO BID formerly [facility]


   Last Admin: 12/29/19 09:02 Dose:  25 mg


Non-Formulary Medication (Benzocaine [Orajel])  1 applic BUCCAL TID PRN


   PRN Reason: sore gums


Non-Formulary Medication (Guaifenesin [Liquituss Gg])  10 ml PO BID PRN


   PRN Reason: Cough


Non-Formulary Medication (Pantoprazole Sodium [Protonix])  20 mg PO DAILY@0500 

NATY